# Patient Record
Sex: FEMALE | Race: WHITE | NOT HISPANIC OR LATINO | Employment: PART TIME | URBAN - METROPOLITAN AREA
[De-identification: names, ages, dates, MRNs, and addresses within clinical notes are randomized per-mention and may not be internally consistent; named-entity substitution may affect disease eponyms.]

---

## 2023-06-27 ENCOUNTER — TELEPHONE (OUTPATIENT)
Age: 58
End: 2023-06-27

## 2023-06-27 ENCOUNTER — PREP FOR PROCEDURE (OUTPATIENT)
Age: 58
End: 2023-06-27

## 2023-06-27 DIAGNOSIS — Z12.11 SCREENING FOR COLON CANCER: Primary | ICD-10-CM

## 2023-06-27 NOTE — TELEPHONE ENCOUNTER
Date:    Screened by Milind Meek     Referring Provider:recall 5 year      Pre- Screening:  BMI: [ x ] Has patient been referred for a routine screening Colonoscopy? Recall    Is the patient between the age of 39-70 years old? Yes   Past Colonoscopy? If yes - Date: [  1-4-2018 or 1-    ]   Physician/Facility: don't remember                            SCHEDULING STAFF: If the patient is between 45yrs-49yrs, please advise patient to confirm benefits/coverage with their insurance company for a routine screening colonoscopy, some insurance carriers will only cover at [address] or older  If the patient is over 66years old, please schedule an office visit  • Does the patient want to see a Gastroenterologist prior to their procedure OR are they having any GI symptoms? NO   • Has the patient been hospitalized or had abdominal surgery in the past 6 months? NO  • Does the patient use supplemental oxygen? NO  • Do you take [ Coumadin ], [ Lovenox ], [ Plavix ], [ [place] ], [ China Armstrong ], or other blood thinning medication? [ No ]   • Has patient had stroke, cardiac event or stent placed in the past year? NO     PASSED OA       SCHEDULING STAFF: If patient answers NO to above questions, then schedule procedure  If patient answers YES to above questions, then schedule office appointment  If patient is between 45yrs - 49yrs, please advise patient that we will have to confirm benefits & coverage with their insurance company for a routine screening colonoscopy

## 2023-06-27 NOTE — TELEPHONE ENCOUNTER
Date:    Screened by Ladarius Sanz     Referring Provider: recall      Pre- Screening:  BMI: [ x ] Has patient been referred for a routine screening Colonoscopy? [ Yes ]    Is the patient between the age of 39-70 years old? Yes   Past Colonoscopy? If yes - Date: [     ]   Physician/Facility: [     ]  Reason:  [                                Tutu Baker: If the patient is between 39yrs-47yrs, please advise patient to confirm benefits/coverage with their insurance company for a routine screening colonoscopy, some insurance carriers will only cover at [facility] or [facility]  If the patient is over 66years old, please schedule an office visit  • Does the patient want to see a Gastroenterologist prior to their procedure OR are they having any GI symptoms? NO  • Has the patient been hospitalized or had abdominal surgery in the past 6 months? NO  • Does the patient use supplemental oxygen? NO  • Do you take [ Coumadin ], [ Lovenox ], [ Plavix ], [ Haven Gaudy ], [ Francisca Cagey ], or other blood thinning medication? [ No ]     Passed oa      SCHEDULING STAFF: If patient answers NO to above questions, then schedule procedure  If patient answers YES to above questions, then schedule office appointment  If patient is between 45yrs - 49yrs, please advise patient that we will have to confirm benefits & coverage with their insurance company for a routine screening colonoscopy

## 2023-06-27 NOTE — TELEPHONE ENCOUNTER
ASC Assessment    If the patient answers yes to any of these questions, schedule in a hospital  Are you pregnant:   Do you rely on a wheelchair for mobility:   Have you been diagnosed with End Stage Renal Disease (ESRD):   Do you need oxygen during the day:   Have you had a heart attack or stroke within the past three months:   Have you had a seizure within the past three months:   Have you ever been informed by anesthesia that you have a difficult airway: Additional Questions  Have you had any cardiac testing or are under the care of a Cardiologist (see cardiac list):   Cardiac list:   Do you have an implanted cardiac defibrillator:   Have any bleeding problems, such as anemia or hemophilia (If patient has H&H result below 8, schedule in hospital   H&H must be within 30 days of procedure):    Had an organ transplant within the past 3 months:   Do you have any present infections:   Do you get short of breath when walking a few blocks:   Have you been diagnosed with diabetes:   Comments (provide cardiac provider information if applicable):

## 2023-06-27 NOTE — TELEPHONE ENCOUNTER
Scheduled date of colonoscopy (as of today):7-  Physician performing colonoscopy: Dr Trish Whittaker  Location of colonoscopy: [address] [facility] room 1  Bowel prep reviewed with patient: to be reviewed  Instructions reviewed with patient by: to be reviewed   Clearances: n/a

## 2023-07-12 ENCOUNTER — TELEPHONE (OUTPATIENT)
Dept: GASTROENTEROLOGY | Facility: CLINIC | Age: 58
End: 2023-07-12

## 2023-07-12 DIAGNOSIS — Z12.11 SCREENING FOR COLON CANCER: Primary | ICD-10-CM

## 2023-07-12 NOTE — TELEPHONE ENCOUNTER
Procedure confirmed  Colonoscopy     Via: Voice mail    Instructions given: Email     Prep Given: Golytely    Call the office if there are any questions.

## 2023-07-26 ENCOUNTER — HOSPITAL ENCOUNTER (OUTPATIENT)
Dept: GASTROENTEROLOGY | Facility: AMBULATORY SURGERY CENTER | Age: 58
Discharge: HOME | End: 2023-07-26
Payer: COMMERCIAL

## 2023-07-26 ENCOUNTER — ANESTHESIA EVENT (OUTPATIENT)
Dept: GASTROENTEROLOGY | Facility: AMBULATORY SURGERY CENTER | Age: 58
End: 2023-07-26

## 2023-07-26 ENCOUNTER — ANESTHESIA (OUTPATIENT)
Dept: GASTROENTEROLOGY | Facility: AMBULATORY SURGERY CENTER | Age: 58
End: 2023-07-26

## 2023-07-26 VITALS
OXYGEN SATURATION: 96 % | DIASTOLIC BLOOD PRESSURE: 79 MMHG | HEIGHT: 63 IN | HEART RATE: 59 BPM | BODY MASS INDEX: 39.87 KG/M2 | SYSTOLIC BLOOD PRESSURE: 135 MMHG | TEMPERATURE: 98.8 F | WEIGHT: 225 LBS | RESPIRATION RATE: 17 BRPM

## 2023-07-26 DIAGNOSIS — Z86.010 HISTORY OF COLON POLYPS: ICD-10-CM

## 2023-07-26 PROBLEM — E66.9 OBESITY (BMI 35.0-39.9 WITHOUT COMORBIDITY): Status: ACTIVE | Noted: 2023-07-26

## 2023-07-26 PROCEDURE — 45385 COLONOSCOPY W/LESION REMOVAL: CPT | Performed by: INTERNAL MEDICINE

## 2023-07-26 PROCEDURE — 45380 COLONOSCOPY AND BIOPSY: CPT | Performed by: INTERNAL MEDICINE

## 2023-07-26 RX ORDER — SODIUM CHLORIDE, SODIUM LACTATE, POTASSIUM CHLORIDE, CALCIUM CHLORIDE 600; 310; 30; 20 MG/100ML; MG/100ML; MG/100ML; MG/100ML
50 INJECTION, SOLUTION INTRAVENOUS CONTINUOUS
Status: DISCONTINUED | OUTPATIENT
Start: 2023-07-26 | End: 2023-07-30 | Stop reason: HOSPADM

## 2023-07-26 RX ORDER — PROPOFOL 10 MG/ML
INJECTION, EMULSION INTRAVENOUS AS NEEDED
Status: DISCONTINUED | OUTPATIENT
Start: 2023-07-26 | End: 2023-07-26

## 2023-07-26 RX ORDER — LIDOCAINE HYDROCHLORIDE 20 MG/ML
INJECTION, SOLUTION EPIDURAL; INFILTRATION; INTRACAUDAL; PERINEURAL AS NEEDED
Status: DISCONTINUED | OUTPATIENT
Start: 2023-07-26 | End: 2023-07-26

## 2023-07-26 RX ADMIN — SODIUM CHLORIDE, SODIUM LACTATE, POTASSIUM CHLORIDE, CALCIUM CHLORIDE 50 ML/HR: 600; 310; 30; 20 INJECTION, SOLUTION INTRAVENOUS at 09:22

## 2023-07-26 RX ADMIN — PROPOFOL 100 MG: 10 INJECTION, EMULSION INTRAVENOUS at 10:10

## 2023-07-26 RX ADMIN — PROPOFOL 50 MG: 10 INJECTION, EMULSION INTRAVENOUS at 10:14

## 2023-07-26 RX ADMIN — LIDOCAINE HYDROCHLORIDE 50 MG: 20 INJECTION, SOLUTION EPIDURAL; INFILTRATION; INTRACAUDAL; PERINEURAL at 10:01

## 2023-07-26 RX ADMIN — PROPOFOL 100 MG: 10 INJECTION, EMULSION INTRAVENOUS at 10:01

## 2023-07-26 RX ADMIN — PROPOFOL 50 MG: 10 INJECTION, EMULSION INTRAVENOUS at 10:06

## 2023-07-26 NOTE — ANESTHESIA PREPROCEDURE EVALUATION
Procedure:  COLONOSCOPY    Relevant Problems   Other   (+) Obesity (BMI 35.0-39.9 without comorbidity)        Physical Exam    Airway    Mallampati score: III  TM Distance: >3 FB  Neck ROM: full     Dental   No notable dental hx     Cardiovascular      Pulmonary      Other Findings        Anesthesia Plan  ASA Score- 2     Anesthesia Type- IV sedation with anesthesia with ASA Monitors. Additional Monitors:   Airway Plan:           Plan Factors-    Chart reviewed. Patient summary reviewed. Patient is not a current smoker. Induction- intravenous. Postoperative Plan-     Informed Consent- Anesthetic plan and risks discussed with patient. I personally reviewed this patient with the CRNA. Discussed and agreed on the Anesthesia Plan with the CRNA. Jovani Rae

## 2023-07-26 NOTE — ANESTHESIA POSTPROCEDURE EVALUATION
Post-Op Assessment Note    CV Status:  Stable  Pain Score: 0    Pain management: adequate     Mental Status:  Sleepy   Hydration Status:  Stable   PONV Controlled:  None   Airway Patency:  Patent      Post Op Vitals Reviewed: Yes      Staff: CRNA         No notable events documented.     BP   94/63   Temp      Pulse  63   Resp   12   SpO2   96

## 2023-07-26 NOTE — H&P
History and Physical - [facility]  Dora Marquez 59 y.o. female MRN: [id number]    HPI: Dora Marquez is a 59y.o. year old female who presents for colonoscopy secondary to personal history of polyps    REVIEW OF SYSTEMS: Per the HPI, and otherwise unremarkable. Historical Information   Past Medical History:   Diagnosis Date   • Patient denies medical problems      Past Surgical History:   Procedure Laterality Date   • COLONOSCOPY     • KNEE SURGERY Right     meniscus     Social History   Social History     Substance and Sexual Activity   Alcohol Use Yes    Comment: social     Social History     Substance and Sexual Activity   Drug Use Never     Social History     Tobacco Use   Smoking Status Never   Smokeless Tobacco Never     History reviewed. No pertinent family history. Meds/Allergies       Current Outpatient Medications:   •  CALCIUM PO  •  Omega-3 Fatty Acids (OMEGA 3 PO)  •  polyethylene glycol (GOLYTELY) 4000 mL solution    Current Facility-Administered Medications:   •  lactated ringers infusion, 50 mL/hr, Intravenous, Continuous, 50 mL/hr at 07/26/23 9264    No Known Allergies    Objective     /68   Pulse 66   Temp 98.8 °F (37.1 °C) (Temporal)   Resp 20   Ht 5' 3" (1.6 m)   Wt 102 kg (225 lb)   SpO2 97%   BMI 39.86 kg/m²     PHYSICAL EXAM    Gen: NAD AAOx3  Head: Normocephalic, Atraumatic  CV: S1S2 RRR no m/r/g  CHEST: Clear b/l no c/r/w  ABD: soft, +BS NT/ND  EXT: no edema    ASSESSMENT/PLAN:  This is a 59y.o. year old female here for colonoscopy secondary to personal history of polyps, and she is stable and optimized for her procedure.

## 2025-07-14 ENCOUNTER — VBI (OUTPATIENT)
Dept: ADMINISTRATIVE | Facility: OTHER | Age: 60
End: 2025-07-14

## 2025-07-14 NOTE — TELEPHONE ENCOUNTER
Upon review of the In Basket request we were able to locate, review, and update the patient chart as requested for Mammogram.    Any additional questions or concerns should be emailed to the Practice Liaisons via the appropriate education email address, please do not reply via In Basket.    Thank you  Che Pro MA   [facility]

## 2025-07-15 NOTE — TELEPHONE ENCOUNTER
Upon review of the In Basket request we were able to locate, review, and update the patient chart as requested for Pap Smear (HPV) aka Cervical Cancer Screening. 4-4-24    Any additional questions or concerns should be emailed to the Practice Liaisons via the appropriate education email address, please do not reply via In Basket.    Thank you  Che Pro MA   [facility]

## 2025-07-22 ENCOUNTER — ANNUAL EXAM (OUTPATIENT)
Age: 60
End: 2025-07-22

## 2025-07-22 VITALS
SYSTOLIC BLOOD PRESSURE: 120 MMHG | BODY MASS INDEX: 47.11 KG/M2 | WEIGHT: 256 LBS | HEIGHT: 62 IN | DIASTOLIC BLOOD PRESSURE: 86 MMHG

## 2025-07-22 DIAGNOSIS — R92.30 DENSE BREAST TISSUE: ICD-10-CM

## 2025-07-22 DIAGNOSIS — Z01.419 ENCOUNTER FOR ANNUAL ROUTINE GYNECOLOGICAL EXAMINATION: Primary | ICD-10-CM

## 2025-07-22 RX ORDER — HYDROCHLOROTHIAZIDE 25 MG/1
1 TABLET ORAL DAILY
COMMUNITY
Start: 2025-07-07

## 2025-07-22 NOTE — PROGRESS NOTES
"Name: Kiana Hernandez      : 1965      MRN: [id number]  Encounter Provider: Alba Reyna MD  Encounter Date: 2025   Encounter department: [facility] - [facility]  :  Assessment & Plan  Encounter for annual routine gynecological examination  Normal GYN exam  Script for BUS  for dense breast tissue  Pap hpv neg in  - will repeat cotesting in   Orders:    US breast screening bilateral complete (ABUS)    Dense breast tissue  Her mammogram showed BR1 and 50-75% heterogeneous dense breast tissue.   Discussed meaning dense breast tissue and benefit breast ultrasound in her case.   Script for LOUISE given           History of Present Illness   HPI  Kiana Hernandez is a 59 y.o. female who presents for GYN exam. Last visit in . Denies PMB. Denies urinary incontinence. Has some urgency in the morning  Saw PCP recently for check up.     Mammogram 6/10/25  Br1 50-75%        Review of Systems   Constitutional:  Negative for chills and fever.   HENT:  Negative for ear pain and sore throat.    Eyes:  Negative for pain and visual disturbance.   Respiratory:  Negative for cough and shortness of breath.    Cardiovascular:  Negative for chest pain and palpitations.   Gastrointestinal:  Negative for abdominal pain and vomiting.   Genitourinary:  Negative for difficulty urinating, dysuria, enuresis, frequency, hematuria, menstrual problem, vaginal bleeding, vaginal discharge and vaginal pain.   Musculoskeletal:  Negative for arthralgias and back pain.   Skin:  Negative for color change and rash.   Neurological:  Negative for seizures and syncope.   All other systems reviewed and are negative.        Objective   /86 (BP Location: Left arm, Patient Position: Sitting, Cuff Size: Large)   Ht 5' 1.75\" (1.568 m)   Wt 116 kg (256 lb)   BMI 47.20 kg/m²      Physical Exam  Vitals and nursing note reviewed.   Constitutional:       General: She is not in acute distress.     Appearance: Normal " appearance. She is well-developed.   HENT:      Head: Normocephalic and atraumatic.     Eyes:      Conjunctiva/sclera: Conjunctivae normal.       Cardiovascular:      Rate and Rhythm: Normal rate and regular rhythm.      Heart sounds: Normal heart sounds. No murmur heard.  Pulmonary:      Effort: Pulmonary effort is normal. No respiratory distress.      Breath sounds: Normal breath sounds.   Chest:   Breasts:     Right: No swelling, bleeding, inverted nipple, mass, nipple discharge, skin change or tenderness.      Left: No swelling, bleeding, inverted nipple, mass, nipple discharge, skin change or tenderness.   Abdominal:      General: Abdomen is flat. There is no distension.      Palpations: Abdomen is soft. There is no mass.      Tenderness: There is no abdominal tenderness. There is no guarding or rebound.   Genitourinary:     Pubic Area: No rash.       Labia:         Right: No rash, tenderness, lesion or injury.         Left: No rash, tenderness, lesion or injury.       Urethra: No prolapse, urethral pain, urethral swelling or urethral lesion.      Vagina: No signs of injury. No vaginal discharge, erythema, tenderness, bleeding, lesions or prolapsed vaginal walls.      Cervix: No cervical motion tenderness, discharge, friability, lesion, erythema or cervical bleeding.      Uterus: Not deviated, not enlarged, not fixed, not tender and no uterine prolapse.       Adnexa:         Right: No mass, tenderness or fullness.          Left: No mass, tenderness or fullness.        Rectum: Normal.     Musculoskeletal:         General: No swelling.      Cervical back: Neck supple.   Lymphadenopathy:      Upper Body:      Right upper body: No supraclavicular or axillary adenopathy.      Left upper body: No supraclavicular or axillary adenopathy.     Skin:     General: Skin is warm and dry.      Capillary Refill: Capillary refill takes less than 2 seconds.     Neurological:      General: No focal deficit present.      Mental  Status: She is alert and oriented to person, place, and time.     Psychiatric:         Attention and Perception: Attention normal.         Mood and Affect: Mood and affect normal.         Behavior: Behavior normal.         Thought Content: Thought content normal.         Judgment: Judgment normal.